# Patient Record
Sex: MALE | URBAN - METROPOLITAN AREA
[De-identification: names, ages, dates, MRNs, and addresses within clinical notes are randomized per-mention and may not be internally consistent; named-entity substitution may affect disease eponyms.]

---

## 2018-09-27 ENCOUNTER — OFFICE VISIT (OUTPATIENT)
Dept: FAMILY MEDICINE | Facility: CLINIC | Age: 48
End: 2018-09-27
Payer: COMMERCIAL

## 2018-09-27 VITALS
HEIGHT: 67 IN | SYSTOLIC BLOOD PRESSURE: 130 MMHG | BODY MASS INDEX: 35.6 KG/M2 | RESPIRATION RATE: 16 BRPM | HEART RATE: 74 BPM | TEMPERATURE: 97.6 F | OXYGEN SATURATION: 97 % | DIASTOLIC BLOOD PRESSURE: 89 MMHG | WEIGHT: 226.8 LBS

## 2018-09-27 DIAGNOSIS — Z13.1 SCREENING FOR DIABETES MELLITUS: ICD-10-CM

## 2018-09-27 DIAGNOSIS — Z00.00 ROUTINE GENERAL MEDICAL EXAMINATION AT A HEALTH CARE FACILITY: Primary | ICD-10-CM

## 2018-09-27 DIAGNOSIS — Z11.3 ROUTINE SCREENING FOR STI (SEXUALLY TRANSMITTED INFECTION): ICD-10-CM

## 2018-09-27 DIAGNOSIS — K40.91 UNILATERAL RECURRENT INGUINAL HERNIA WITHOUT OBSTRUCTION OR GANGRENE: ICD-10-CM

## 2018-09-27 DIAGNOSIS — Z12.11 SPECIAL SCREENING FOR MALIGNANT NEOPLASMS, COLON: ICD-10-CM

## 2018-09-27 DIAGNOSIS — I10 BENIGN ESSENTIAL HYPERTENSION: ICD-10-CM

## 2018-09-27 LAB
ALBUMIN SERPL-MCNC: 3.7 G/DL (ref 3.4–5)
ALP SERPL-CCNC: 67 U/L (ref 40–150)
ALT SERPL W P-5'-P-CCNC: 34 U/L (ref 0–70)
ANION GAP SERPL CALCULATED.3IONS-SCNC: 7 MMOL/L (ref 3–14)
AST SERPL W P-5'-P-CCNC: 24 U/L (ref 0–45)
BILIRUB SERPL-MCNC: 1 MG/DL (ref 0.2–1.3)
BUN SERPL-MCNC: 20 MG/DL (ref 7–30)
CALCIUM SERPL-MCNC: 8.8 MG/DL (ref 8.5–10.1)
CHLORIDE SERPL-SCNC: 100 MMOL/L (ref 94–109)
CHOLEST SERPL-MCNC: 187 MG/DL
CO2 SERPL-SCNC: 27 MMOL/L (ref 20–32)
CREAT SERPL-MCNC: 1.78 MG/DL (ref 0.66–1.25)
GFR SERPL CREATININE-BSD FRML MDRD: 41 ML/MIN/1.7M2
GLUCOSE SERPL-MCNC: 103 MG/DL (ref 70–99)
HBA1C MFR BLD: 5.8 % (ref 4.1–5.7)
HDLC SERPL-MCNC: 47 MG/DL
LDLC SERPL CALC-MCNC: 104 MG/DL
NONHDLC SERPL-MCNC: 140 MG/DL
POTASSIUM SERPL-SCNC: 4.4 MMOL/L (ref 3.4–5.3)
PROT SERPL-MCNC: 7.2 G/DL (ref 6.8–8.8)
SODIUM SERPL-SCNC: 134 MMOL/L (ref 133–144)
TRIGL SERPL-MCNC: 178 MG/DL

## 2018-09-27 ASSESSMENT — ANXIETY QUESTIONNAIRES
6. BECOMING EASILY ANNOYED OR IRRITABLE: NOT AT ALL
7. FEELING AFRAID AS IF SOMETHING AWFUL MIGHT HAPPEN: NOT AT ALL
2. NOT BEING ABLE TO STOP OR CONTROL WORRYING: NOT AT ALL
1. FEELING NERVOUS, ANXIOUS, OR ON EDGE: NOT AT ALL
3. WORRYING TOO MUCH ABOUT DIFFERENT THINGS: NOT AT ALL
GAD7 TOTAL SCORE: 0
5. BEING SO RESTLESS THAT IT IS HARD TO SIT STILL: NOT AT ALL
IF YOU CHECKED OFF ANY PROBLEMS ON THIS QUESTIONNAIRE, HOW DIFFICULT HAVE THESE PROBLEMS MADE IT FOR YOU TO DO YOUR WORK, TAKE CARE OF THINGS AT HOME, OR GET ALONG WITH OTHER PEOPLE: NOT DIFFICULT AT ALL

## 2018-09-27 ASSESSMENT — PATIENT HEALTH QUESTIONNAIRE - PHQ9: 5. POOR APPETITE OR OVEREATING: NOT AT ALL

## 2018-09-27 NOTE — MR AVS SNAPSHOT
After Visit Summary   9/27/2018    Yuliana Phelps    MRN: [id number]           Patient Information     Date Of Birth          1970        Visit Information        Provider Department      9/27/2018 10:00 AM Patricia Desai APRN CNP [facility]        Today's Diagnoses     Routine general medical examination at a health care facility    -  1    Benign essential hypertension        Screening for diabetes mellitus        Routine screening for STI (sexually transmitted infection)        Special screening for malignant neoplasms, colon          Care Instructions    - We will call you with results from today's visit.   - You can have these records faxed to your primary care provider in [place] when you go home in October      ----> Discuss with them other blood pressure medications that are not three times a day dosing. There are a lot of options out there!              Follow-ups after your visit        Future tests that were ordered for you today     Open Future Orders        Priority Expected Expires Ordered    Fecal colorectal cancer screen FITT Routine 10/18/2018 12/20/2018 9/27/2018            Who to contact     Please call your clinic at [phone] to:    Ask questions about your health    Make or cancel appointments    Discuss your medicines    Learn about your test results    Speak to your doctor            Additional Information About Your Visit        MyChart Information     [employer] is an electronic gateway that provides easy, online access to your medical records. With [employer], you can request a clinic appointment, read your test results, renew a prescription or communicate with your care team.     To sign up for [employer] visit the website at [email or web address]   You will be asked to enter the access code listed below, as well as some personal information. Please follow the directions to create your username and password.     Your access code is: [email or web address]  Expires:  "2018 10:38 AM     Your access code will  in 90 days. If you need help or a new code, please contact your [facility] or call [phone] for assistance.        Care EveryWhere ID     This is your Care EveryWhere ID. This could be used by other organizations to access your [place] medical records  [id number]        Your Vitals Were     Pulse Temperature Respirations Height Pulse Oximetry BMI (Body Mass Index)    74 97.6  F (36.4  C) (Oral) 16 5' 7.1\" (170.4 cm) 97% 35.42 kg/m2       Blood Pressure from Last 3 Encounters:   18 130/89    Weight from Last 3 Encounters:   18 226 lb 12.8 oz (102.9 kg)              We Performed the Following     CHLAMYDIA TRACHOMATIS PCR     Comprehensive metabolic panel     Hemoglobin A1c ([facility])     HIV Antigen Antibody Combo     Lipid panel reflex to direct LDL Fasting     NEISSERIA GONORRHOEA PCR     Treponema Abs w Reflex to RPR and Titer          Today's Medication Changes          These changes are accurate as of 18 10:38 AM.  If you have any questions, ask your nurse or doctor.               Stop taking these medicines if you haven't already. Please contact your care team if you have questions.     Baljeet altman/Yony/Small Misc   Stopped by:  Patricia Desai, APRN CNP                    Primary Care Provider    None Specified       No primary provider on file.        Equal Access to Services     [facility]: Hadii aad ku hadasho Soomaali, waaxda luqadaha, qaybta kaalmada adeegyada, mary skinner . So [facility] [phone].    ATENCIÓN: Si habla español, tiene a kennedy disposición servicios gratuitos de asistencia lingüística. Llandreas al [phone].    We comply with applicable federal civil rights laws and [place] laws. We do not discriminate on the basis of race, color, national origin, age, disability, sex, sexual orientation, or gender identity.            Thank you!     Thank you " for choosing [facility]  for your care. Our goal is always to provide you with excellent care. Hearing back from our patients is one way we can continue to improve our services. Please take a few minutes to complete the written survey that you may receive in the mail after your visit with us. Thank you!             Your Updated Medication List - Protect others around you: Learn how to safely use, store and throw away your medicines at www.disposemymeds.org.          This list is accurate as of 9/27/18 10:38 AM.  Always use your most recent med list.                   Brand Name Dispense Instructions for use Diagnosis    COREG PO      Take 25 mg by mouth 2 times daily (with meals)        HYDRALAZINE HCL PO      Take by mouth 3 times daily        NIFEDIPINE PO      Take 60 mg by mouth daily

## 2018-09-27 NOTE — PROGRESS NOTES
" SUBJECTIVE:   Yuliana Phelps is an 48 year old year old man who presents for preventive health visit. 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 Endorses recent URI. Recently finished Levaquin and prednisone. Currently feels better, endorsing some residual symptoms such as congestion, moderate cough.    Healthy Habits:    Amount of exercise or daily activities, outside of work: 5 day(s) 30 minutes of cardio per week    Problems taking medications regularly No    Medication side effects: No    Have you had an eye exam in the past two years? yes    Do you see a dentist twice per year? No -ref[facility] at [facility]    Do you have sleep apnea, excessive snoring or daytime drowsiness?no    Water: drink 1.5 L a day    Caffeine none    Sleep 5-6 hours    Calcium: Minimal dairy intake. Does not drink milk  Nutrition: Variety of fruits and vegetable, primary consumes chicken for protein    Essential hypertension: diagnosed while living in [place] and currently controlled on nifedipine, hydralazine, and coreg. Reports history of CVA in 2017 which was right-side affected. Denies any longstanding effects from CVA aside from very occasional \"funny feeling\" in his right hand, noted more when fatigued. Has followed up with neurologist in [place] and is planning on doing so when he returns home in October. Denies any angina, dyspnea, or side effects from medications. Denies any previous myocardial infarction. Has had some weight gain after returning from a trip to [place] and is working on dietary changes and implementing exercise 5 times/week back into his daily routine.     Inguinal hernia: has on right side for several years. Is small in nature and \"has never been\" an issue for him. Denies any pain, swelling, or difficulties urinating. Denies any abdominal pain, cramping, or scrotal changes. No issues at this time.       Today's " PHQ-9 and SIOMRAA-7 Score:   PHQ-9 SCORE 9/27/2018   Total Score 0   .  SIOMARA-7 SCORE 9/27/2018   Total Score 0     Abuse: Current or Past(Physical, Sexual or Emotional)- No  Do you feel safe in your environment - Yes    Past Medical History:   Diagnosis Date     Hypertension 2006     Stroke (cerebrum) (H) 2017    slight residual weakness     Past Surgical History:   Procedure Laterality Date     NO HISTORY OF SURGERY       Social History     Social History     Marital status: Single     Spouse name: N/A     Number of children: N/A     Years of education: N/A     Occupational History      at [employer]       Social History Main Topics     Smoking status: Never Smoker     Smokeless tobacco: Never Used     Alcohol use No     Drug use: No     Sexual activity: Yes     Partners: Female      Comment: 1     Other Topics Concern     Not on file     Social History Narrative     No narrative on file     Family History   Problem Relation Age of Onset     Type 2 Diabetes Mother      KIDNEY DISEASE Father      Hypertension Father      Cancer Maternal Grandmother      Passed from Cancer late 70s     Aneurysm Maternal Grandfather      Passed from Anuerysm lat 60s       If you drink alcohol do you typically have >3 drinks per day or >10 drinks per week? No                     Reviewed orders with patient.  Reviewed health maintenance and updated orders accordingly - Yes        Current Outpatient Prescriptions   Medication Sig Dispense Refill     Carvedilol (COREG PO) Take 25 mg by mouth 2 times daily (with meals)       HYDRALAZINE HCL PO Take by mouth 3 times daily       NIFEDIPINE PO Take 60 mg by mouth daily       Allergies   Allergen Reactions     Zestril [Lisinopril]        ROS:  Constitutional: no fevers, chills, night sweats or unintentional weight change   Eyes: no vision change, diplopia or red eyes   Ears, Nose, Mouth, Throat: no tinnitus or hearing change, no epistaxis or nasal discharge, no oral lesions,  "throat clear   Cardiovascular: no chest pain, palpitations, or pain with walking, no orthopnea or PND   Respiratory: no dyspnea, cough, shortness of breath or wheezing   GI: no nausea, vomiting, diarrhea or constipation, no abdominal pain   : no change in urine, no dysuria or hematuria, no sexual dysfunction   Musculoskeletal: no joint or muscle pain or swelling. sorenes in knees.   Integumentary: no concerning lesions or moles   Neuro: no loss of strength or sensation, no numbness or tingling, no tremor, no dizziness, no headache, no gait disturbance   Endo: no polyuria or polydipsia, no temperature intolerance.   Heme/Lymph: no concerning bumps, no bleeding problems   Allergy: no environmental allergies   Psych: no depression or anxiety, no sleep problems    OBJECTIVE:   /89 (BP Location: Left arm, Patient Position: Chair, Cuff Size: Adult Regular)  Pulse 74  Temp 97.6  F (36.4  C) (Oral)  Resp 16  Ht 5' 7.1\" (170.4 cm)  Wt 226 lb 12.8 oz (102.9 kg)  SpO2 97%  BMI 35.42 kg/m2  EXAM:  GENERAL: healthy, alert and no distress.   EYES: Eyes grossly normal to inspection, PERRL and conjunctivae and sclerae normal  HENT: ear canals and TM's normal, nose and mouth without ulcers or lesions  NECK: no adenopathy, no asymmetry, masses, or scars and thyroid normal to palpation. No carotid bruits.  RESP: lungs clear to auscultation - no rales, rhonchi or wheezes  CV: regular rate and rhythm, normal S1 S2, no S3 or S4, no murmur, click or rub, no peripheral edema and peripheral pulses strong  ABDOMEN: soft, nontender, rounded, no hepatosplenomegaly, no masses and bowel sounds normal  MS: no gross musculoskeletal defects noted, no edema  : Small inguinal hernia on right side, palpable/illicited with cough otherwise not noticeable.   SKIN: no suspicious lesions or rashes  NEURO: Normal strength and tone, mentation intact and speech normal. Cranial nerves 2-12 intact.  PSYCH: mentation appears normal, affect " "normal/bright    ASSESSMENT/PLAN:   Yuliana was seen today for physical.    Diagnoses and all orders for this visit:    Routine general medical examination at a health care facility  -     Lipid panel reflex to direct LDL Fasting  -     Hemoglobin A1c ([facility])  -     Fecal colorectal cancer screen FITT; Future    Benign essential hypertension  -     Comprehensive metabolic panel    Screening for diabetes mellitus  -     Hemoglobin A1c ([facility])    Routine screening for STI (sexually transmitted infection)  -     NEISSERIA GONORRHOEA PCR  -     CHLAMYDIA TRACHOMATIS PCR  -     HIV Antigen Antibody Combo  -     Treponema Abs w Reflex to RPR and Titer    Special screening for malignant neoplasms, colon  -     Fecal colorectal cancer screen FITT; Future    Unilateral recurrent inguinal hernia without obstruction or gangrene    Routine General Medical Exam:  -Current on eye exam  -F/U with dental therapist at [facility]- appt tomorrow  -Discussed with pt, plans on transferring care to [facility], RAMIREZ form given    Benign Essential Hypertension:  -blood pressure 130/89 today - not at ideal goal  -Taking Coreg 25 BID, Nifedipine 60mg, and Hydralazine 100 TID at home  -Pt to f/u with PCP in [place], if he continues care here will discuss changes in medications with lab follow-up    Inguinal Hernia:  -Reviewed s/s of an incarcerated hernia and when it is necessary to go to the ED.      COUNSELING:      Reports that he has never smoked. He has never used smokeless tobacco.    Estimated body mass index is 35.42 kg/(m^2) as calculated from the following:    Height as of this encounter: 5'7\" (170.4 cm).    Weight as of this encounter: 226lb (102.9 kg).   Weight management plan: Encouraged cardio activity and healthy diet.    Counseling Resources:  ATP IV Guidelines  Pooled Cohorts Equation Calculator  ICSI Preventive Guidelines  Dietary Guidelines for Americans, 2010  USDA's MyPlate  ASA Prophylaxis  Lung CA " Screening    Options for treatment and follow-up care were reviewed with the patient. Yuliana Phelps   engaged in the decision making process and verbalized understanding of the options discussed and agreed with the final plan.    WENDY Payne CNP on 9/27/2018 at 10:04 AM  [facility]

## 2018-09-27 NOTE — NURSING NOTE
"48 year old  Chief Complaint   Patient presents with     Physical     Pt. presents to the clinic today for a physical       Blood pressure 130/89, pulse 74, temperature 97.6  F (36.4  C), temperature source Oral, resp. rate 16, height 5' 7.1\" (170.4 cm), weight 226 lb 12.8 oz (102.9 kg), SpO2 97 %. Body mass index is 35.42 kg/(m^2).  BP completed using cuff size:    There is no problem list on file for this patient.      Wt Readings from Last 2 Encounters:   09/27/18 226 lb 12.8 oz (102.9 kg)     BP Readings from Last 3 Encounters:   09/27/18 130/89       Allergies   Allergen Reactions     Zestril [Lisinopril]        Current Outpatient Prescriptions   Medication     HYDROXYZINE HCL PO     Misc. Devices (COVERALL W/AARON/SMALL) MISC     No current facility-administered medications for this visit.        Social History   Substance Use Topics     Smoking status: Never Smoker     Smokeless tobacco: Never Used     Alcohol use No         Honoring Choices - Health Care Directive Guide offered to patient at time of visit.    Health Maintenance Due   Topic Date Due     PHQ-2 Q1 YR  01/25/1982     TETANUS IMMUNIZATION (SYSTEM ASSIGNED)  01/25/1988     HIV SCREEN (SYSTEM ASSIGNED)  01/25/1988     LIPID SCREEN Q5 YR MALE (SYSTEM ASSIGNED)  01/25/2005     INFLUENZA VACCINE (1) 09/01/2018         There is no immunization history on file for this patient.    No results found for: PAP      No lab results found.    PHQ-2 ( 1999 Pfizer) 9/27/2018   Q1: Little interest or pleasure in doing things 0   Q2: Feeling down, depressed or hopeless 0   PHQ-2 Score 0       PHQ-9 SCORE 9/27/2018   Total Score 0       SIOMARA-7 SCORE 9/27/2018   Total Score 0     FIT test given to patient with instructions on how to collect and mail in the sample once collected.      No flowsheet data found.    Terese Dee CMA  September 27, 2018 9:45 AM  "

## 2018-09-27 NOTE — PATIENT INSTRUCTIONS
- We will call you with results from today's visit.   - You can have these records faxed to your primary care provider in [place] when you go home in October      ----> Discuss with them other blood pressure medications that are not three times a day dosing. There are a lot of options out there!

## 2018-09-28 LAB
C TRACH DNA SPEC QL NAA+PROBE: NEGATIVE
N GONORRHOEA DNA SPEC QL NAA+PROBE: NEGATIVE
SPECIMEN SOURCE: NORMAL
SPECIMEN SOURCE: NORMAL

## 2018-09-28 ASSESSMENT — PATIENT HEALTH QUESTIONNAIRE - PHQ9: SUM OF ALL RESPONSES TO PHQ QUESTIONS 1-9: 0

## 2018-09-28 ASSESSMENT — ANXIETY QUESTIONNAIRES: GAD7 TOTAL SCORE: 0

## 2018-09-29 LAB — T PALLIDUM AB SER QL: NONREACTIVE

## 2018-10-01 LAB — HIV 1+2 AB+HIV1 P24 AG SERPL QL IA: NONREACTIVE

## 2018-10-02 ENCOUNTER — TELEPHONE (OUTPATIENT)
Dept: FAMILY MEDICINE | Facility: CLINIC | Age: 48
End: 2018-10-02

## 2018-10-03 NOTE — TELEPHONE ENCOUNTER
"Called Yuliana, confirmed  and reviewed lab results: chronic kidney disease, dyslipidemia, and pre-diabetes. Yuliana stated he was aware of CKD and that it \"wasn't a new issue\". Has never followed up with nephrology. As there are no previous records, advised Yuliana to follow up with his PCP to ensure his kidney function has not declined since last physical and visit/results can be faxed to primary in [place]. Yuliana stated he was planning on making [facility] his primary clinic, would be requesting records to be transferred here and would follow up here from now on. Advised Yuliana he should follow up in the next month or two to monitor renal function, start checking his blood pressure more regularly (reported has monitor at home, does not use regularly) to ensure his BP is at goal. Yuliana agreed with plan.   Patricia Desai, APRN CNP  2018  "

## 2018-10-05 DIAGNOSIS — Z12.11 SPECIAL SCREENING FOR MALIGNANT NEOPLASMS, COLON: ICD-10-CM

## 2018-10-05 DIAGNOSIS — Z00.00 ROUTINE GENERAL MEDICAL EXAMINATION AT A HEALTH CARE FACILITY: ICD-10-CM

## 2018-10-05 PROCEDURE — 82274 ASSAY TEST FOR BLOOD FECAL: CPT | Performed by: NURSE PRACTITIONER

## 2018-10-07 LAB — HEMOCCULT STL QL IA: NEGATIVE

## 2018-10-10 ENCOUNTER — TELEPHONE (OUTPATIENT)
Dept: FAMILY MEDICINE | Facility: CLINIC | Age: 48
End: 2018-10-10

## 2018-10-10 DIAGNOSIS — I10 BENIGN ESSENTIAL HYPERTENSION: Primary | ICD-10-CM

## 2018-10-10 DIAGNOSIS — I13.10 HYPERTENSIVE HEART AND RENAL DISEASE WITH RENAL FAILURE, STAGE 1 THROUGH STAGE 4 OR UNSPECIFIED CHRONIC KIDNEY DISEASE, WITHOUT HEART FAILURE: Primary | ICD-10-CM

## 2018-10-10 RX ORDER — CARVEDILOL 25 MG/1
25 TABLET ORAL 2 TIMES DAILY WITH MEALS
Qty: 60 TABLET | Refills: 1 | Status: SHIPPED | OUTPATIENT
Start: 2018-10-10 | End: 2018-12-01

## 2018-10-10 RX ORDER — CHLORTHALIDONE 25 MG/1
12.5 TABLET ORAL DAILY
Qty: 15 TABLET | Refills: 0 | Status: SHIPPED | OUTPATIENT
Start: 2018-10-10

## 2018-10-10 NOTE — TELEPHONE ENCOUNTER
Patient is calling requesting a refill on all his medications. He can be reached at the incoming number.

## 2018-10-10 NOTE — TELEPHONE ENCOUNTER
Patient previously called to refill medications. Hydralazine is not a conventional antihypertensive.  Talked to patient about pros and cons of a diuretic like chlorthalidone versus continuing on hydralazine.  He would like to switch to chlorthalidone and noted that increased urination would not be bothersome.  He understands that he should schedule a follow up visit in 2 weeks to f/u on BP and obtain labs.

## 2018-12-01 DIAGNOSIS — I13.10 HYPERTENSIVE HEART AND RENAL DISEASE WITH RENAL FAILURE, STAGE 1 THROUGH STAGE 4 OR UNSPECIFIED CHRONIC KIDNEY DISEASE, WITHOUT HEART FAILURE: ICD-10-CM

## 2018-12-01 RX ORDER — CARVEDILOL 25 MG/1
25 TABLET ORAL 2 TIMES DAILY WITH MEALS
Qty: 180 TABLET | Refills: 2 | Status: SHIPPED | OUTPATIENT
Start: 2018-12-01

## 2018-12-04 DIAGNOSIS — I13.10 HYPERTENSIVE HEART AND RENAL DISEASE WITH RENAL FAILURE, STAGE 1 THROUGH STAGE 4 OR UNSPECIFIED CHRONIC KIDNEY DISEASE, WITHOUT HEART FAILURE: ICD-10-CM

## 2020-03-11 ENCOUNTER — HEALTH MAINTENANCE LETTER (OUTPATIENT)
Age: 50
End: 2020-03-11

## 2021-01-03 ENCOUNTER — HEALTH MAINTENANCE LETTER (OUTPATIENT)
Age: 51
End: 2021-01-03

## 2021-04-25 ENCOUNTER — HEALTH MAINTENANCE LETTER (OUTPATIENT)
Age: 51
End: 2021-04-25

## 2021-10-10 ENCOUNTER — HEALTH MAINTENANCE LETTER (OUTPATIENT)
Age: 51
End: 2021-10-10

## 2022-05-21 ENCOUNTER — HEALTH MAINTENANCE LETTER (OUTPATIENT)
Age: 52
End: 2022-05-21

## 2022-09-18 ENCOUNTER — HEALTH MAINTENANCE LETTER (OUTPATIENT)
Age: 52
End: 2022-09-18

## 2023-06-04 ENCOUNTER — HEALTH MAINTENANCE LETTER (OUTPATIENT)
Age: 53
End: 2023-06-04